# Patient Record
Sex: FEMALE | Race: BLACK OR AFRICAN AMERICAN | NOT HISPANIC OR LATINO | Employment: FULL TIME | URBAN - METROPOLITAN AREA
[De-identification: names, ages, dates, MRNs, and addresses within clinical notes are randomized per-mention and may not be internally consistent; named-entity substitution may affect disease eponyms.]

---

## 2024-06-26 ENCOUNTER — OFFICE VISIT (OUTPATIENT)
Dept: URGENT CARE | Facility: CLINIC | Age: 34
End: 2024-06-26
Payer: COMMERCIAL

## 2024-06-26 VITALS
RESPIRATION RATE: 18 BRPM | DIASTOLIC BLOOD PRESSURE: 84 MMHG | WEIGHT: 180 LBS | HEART RATE: 66 BPM | OXYGEN SATURATION: 98 % | HEIGHT: 67 IN | BODY MASS INDEX: 28.25 KG/M2 | TEMPERATURE: 99 F | SYSTOLIC BLOOD PRESSURE: 124 MMHG

## 2024-06-26 DIAGNOSIS — N30.01 ACUTE CYSTITIS WITH HEMATURIA: Primary | ICD-10-CM

## 2024-06-26 DIAGNOSIS — R30.0 DYSURIA: ICD-10-CM

## 2024-06-26 LAB
BILIRUB UR QL STRIP: NEGATIVE
GLUCOSE UR QL STRIP: NEGATIVE
KETONES UR QL STRIP: NEGATIVE
LEUKOCYTE ESTERASE UR QL STRIP: POSITIVE
PH, POC UA: 7
POC BLOOD, URINE: POSITIVE
POC NITRATES, URINE: NEGATIVE
PROT UR QL STRIP: NEGATIVE
SP GR UR STRIP: 1 (ref 1–1.03)
UROBILINOGEN UR STRIP-ACNC: ABNORMAL (ref 0.1–1.1)

## 2024-06-26 PROCEDURE — 81003 URINALYSIS AUTO W/O SCOPE: CPT | Mod: QW,,, | Performed by: FAMILY MEDICINE

## 2024-06-26 PROCEDURE — 87086 URINE CULTURE/COLONY COUNT: CPT | Performed by: FAMILY MEDICINE

## 2024-06-26 PROCEDURE — 99203 OFFICE O/P NEW LOW 30 MIN: CPT | Mod: ,,, | Performed by: FAMILY MEDICINE

## 2024-06-26 RX ORDER — PHENAZOPYRIDINE HYDROCHLORIDE 200 MG/1
200 TABLET, FILM COATED ORAL 3 TIMES DAILY PRN
Qty: 6 TABLET | Refills: 0 | Status: SHIPPED | OUTPATIENT
Start: 2024-06-26 | End: 2024-06-28

## 2024-06-26 RX ORDER — NITROFURANTOIN 25; 75 MG/1; MG/1
100 CAPSULE ORAL 2 TIMES DAILY
Qty: 10 CAPSULE | Refills: 0 | Status: SHIPPED | OUTPATIENT
Start: 2024-06-26 | End: 2024-07-01

## 2024-06-26 NOTE — PROGRESS NOTES
"Subjective:      Patient ID: Janet Tripp is a 34 y.o. female.    Vitals:  height is 5' 7" (1.702 m) and weight is 81.6 kg (180 lb). Her temperature is 98.7 °F (37.1 °C). Her blood pressure is 124/84 and her pulse is 66. Her respiration is 18 and oxygen saturation is 98%.     Chief Complaint: Dysuria     Patient is a 34 y.o. female who presents to urgent care with complaints of frequent urination, spasm  x1 days. Alleviating factors include azo with no relief. Patient denies fever.      Cahto:  34-year-old female otherwise healthy present to clinic with acute onset burning with urination, urgency and frequency associated with cramping lower abdomen started today.  States feels like muscle spasm with urination.  LMP June 9, 2024.  No history of UTI in the past.  No history of kidney stones.  Over-the-counter medications not much help.  No fever    ROS:  Constitutional : _No fever, no fatigue or weakness  Neck : _Negative except HPI  Respiratory :_ No coughing, no shortness of breath  Cardiovascular : _No chest pain, no palpitations  Gastrointestinal : _No nausea, no vomiting  Genitourinary : _No flank pain, no vaginal discharge  Integumentary : _Negative for skin rash   Objective:     Physical Exam  General : Alert and Oriented, No apparent distress, afebrile  Neck - supple  Respiratory : Bilateral equal breath sounds, nonlabored respirations, clear to auscultate   Cardiovascular : Rate rhythm regular, normal volume pulse  Gastrointestinal : Soft, mild suprapubic pressure on palpation , BS X 4 quadrants - normal  Genitourinary : No CVA tenderness Bilateral  Integumentary : Warm, Dry and no rash   Assessment:     1. Acute cystitis with hematuria    2. Dysuria      Plan:   Adequate hydration.  Discussed in detail on condition and course.  Medications as prescribed.   Urine Culture results will be monitored and reported, and treatment changes made if necessary.  ER precautions with worsening symptoms, fever, flank pain, " not able to urinate.   Call or return to clinic as needed. Voiced understanding verbally.  Urine dipstick results shared     Acute cystitis with hematuria  -     Urine culture  -     nitrofurantoin, macrocrystal-monohydrate, (MACROBID) 100 MG capsule; Take 1 capsule (100 mg total) by mouth 2 (two) times daily. for 5 days  Dispense: 10 capsule; Refill: 0  -     phenazopyridine (PYRIDIUM) 200 MG tablet; Take 1 tablet (200 mg total) by mouth 3 (three) times daily as needed for Pain.  Dispense: 6 tablet; Refill: 0    Dysuria  -     POCT Urinalysis, Dipstick, Automated, W/O Scope

## 2024-06-26 NOTE — PATIENT INSTRUCTIONS
Adequate hydration.  Discussed in detail on condition and course.  Medications as prescribed.   Urine Culture results will be monitored and reported, and treatment changes made if necessary.  ER precautions with worsening symptoms, fever, flank pain, not able to urinate.   Call or return to clinic as needed. Voiced understanding verbally.  Urine dipstick results shared

## 2024-06-28 ENCOUNTER — TELEPHONE (OUTPATIENT)
Dept: URGENT CARE | Facility: CLINIC | Age: 34
End: 2024-06-28
Payer: COMMERCIAL

## 2024-06-28 LAB — BACTERIA UR CULT: ABNORMAL

## 2024-06-28 NOTE — TELEPHONE ENCOUNTER
Pt was seen in clinic on 06/26/2024 by Nima Garcia MD for hematuria. Pt was Rx Macrobid 100 mg capsules and pyridium 200 mg.  Pt has contacted clinic in regards to urine cx. The preliminary results are posted; still awaiting final urine cx report. Pt also had concerns that antibiotic is not effective. Attempted to contact pt for further information regarding concerns. No success.     Would like us to contact the following line: [phone]

## 2024-06-29 NOTE — TELEPHONE ENCOUNTER
Unsuccessful in contacting patient to discuss urine culture result and the possibility of antibiotic change. Will attempt to contact patient again throughout the day.

## 2024-10-10 ENCOUNTER — OFFICE VISIT (OUTPATIENT)
Dept: FAMILY MEDICINE | Facility: CLINIC | Age: 34
End: 2024-10-10
Payer: COMMERCIAL

## 2024-10-10 VITALS
HEIGHT: 67 IN | WEIGHT: 169 LBS | OXYGEN SATURATION: 99 % | HEART RATE: 63 BPM | DIASTOLIC BLOOD PRESSURE: 74 MMHG | SYSTOLIC BLOOD PRESSURE: 109 MMHG | BODY MASS INDEX: 26.53 KG/M2

## 2024-10-10 DIAGNOSIS — R31.21 ASYMPTOMATIC MICROSCOPIC HEMATURIA: ICD-10-CM

## 2024-10-10 DIAGNOSIS — Z80.3 FAMILY HISTORY OF BREAST CANCER IN MOTHER: ICD-10-CM

## 2024-10-10 DIAGNOSIS — Z00.00 WELLNESS EXAMINATION: Primary | ICD-10-CM

## 2024-10-10 NOTE — PROGRESS NOTES
Patient ID: [id number]     Chief Complaint: Establish Care        HPI:     Janet Tripp is a 34 y.o. female here today for annual wellness exam.  The patient presents for well adult exam, here to establish care with new MD.    The patient's general health status is described as good.    The patient's diet is described as balanced.    Exercise: occasional.    Associated symptoms consist of denies weight loss, denies weight gain, denies fatigue, denies headache, denies hearing loss and denies vision changes.    Additional pertinent history: last dental exam: goes every 6 months, last eye exam: > 1 year ago(doesn't wear corrective lens, she doesn't have vision insurance, but she will schedule an appointment [facility] or [facility])  She is due for annual labs today. She is not interested in HIV and Hepatitis C testing.   LMP: 2024, regular.  Cervical Cancer Screening - Last Pap on 10/2023. Follow up with GYN Clinic for Pap/Pelvic with Dr. Suárez, she is scheduled for pap smear in 2024.     Vaccinations: COVID/ flu vaccine- not interested    - She is seeing an allergist (Dr. Sevilla) for history of urticaria with menstrual cycles, stable.     - She has microscopic hematuria, stable, undergoing a workup with Urology at [facility].     - Her mother  at 57 years ago due to breast cancer, patient is followed by GYN, not interested in referral to high risk breast clinic referral.     - Patient is without any other complaints today.         Advance Care Planning     Date: 10/10/2024  Patient did not wish or was not able to name a surrogate decision maker or provide an Advance Care Plan.        No past medical history on file.     Past Surgical History:   Procedure Laterality Date     SECTION      FINGER FRACTURE SURGERY         Review of patient's allergies indicates:  No Known Allergies    No outpatient medications have been marked as taking for the 10/10/24 encounter (Office Visit) with  Nikki Kam MD.       Social History     Socioeconomic History    Marital status:    Tobacco Use    Smoking status: Never    Smokeless tobacco: Never   Substance and Sexual Activity    Alcohol use: Never    Drug use: Never    Sexual activity: Yes     Social Drivers of Health     Financial Resource Strain: Low Risk  (10/8/2024)    Overall Financial Resource Strain (CARDIA)     Difficulty of Paying Living Expenses: Not hard at all   Food Insecurity: No Food Insecurity (10/8/2024)    Hunger Vital Sign     Worried About Running Out of Food in the Last Year: Never true     Ran Out of Food in the Last Year: Never true   Transportation Needs: No Transportation Needs (10/10/2024)    TRANSPORTATION NEEDS     Transportation : No   Physical Activity: Sufficiently Active (10/8/2024)    Exercise Vital Sign     Days of Exercise per Week: 4 days     Minutes of Exercise per Session: 60 min   Stress: Stress Concern Present (10/8/2024)    [employer] [place] [employer] - Occupational Stress Questionnaire     Feeling of Stress : To some extent   Housing Stability: Low Risk  (10/10/2024)    Housing Stability Vital Sign     Unable to Pay for Housing in the Last Year: No     Homeless in the Last Year: No        Family History   Problem Relation Name Age of Onset    Breast cancer Mother      No Known Problems Father          Subjective:       Review of Systems:    See HPI for details    Constitutional: Denies Change in appetite. Denies Chills. Denies Fever. Denies Night sweats.  Eye: Denies Blurred vision. Denies Discharge. Denies Eye pain.  ENT: Denies Decreased hearing. Denies Sore throat. Denies Swollen glands.  Respiratory: Denies Cough. Denies Shortness of breath. Denies Shortness of breath with exertion. Denies Wheezing.  Cardiovascular: Denies Chest pain at rest. Denies Chest pain with exertion. Denies Irregular heartbeat. Denies Palpitations.  Gastrointestinal: Denies Abdominal pain. Denies Diarrhea.  "Denies Nausea. Denies Vomiting. Denies Hematemesis or Hematochezia.  Genitourinary: Denies Dysuria. Denies Urinary frequency. Denies Urinary urgency. Denies Blood in urine.  Endocrine: Denies Cold intolerance. Denies Excessive thirst. Denies Heat intolerance. Denies Weight loss. Denies Weight gain.  Musculoskeletal: Denies Painful joints. Denies Weakness.  Integumentary: Denies Rash. Denies Itching. Denies Dry skin.  Neurologic: Denies Dizziness. Denies Fainting. Denies Headache.  Psychiatric: Denies Depression. Denies Anxiety. Denies Suicidal/Homicidal ideations.    All Other ROS: Negative except as stated in HPI.       Objective:     /74   Pulse 63   Ht 5' 7" (1.702 m)   Wt 76.7 kg (169 lb)   LMP 09/30/2024 (Exact Date)   SpO2 99%   BMI 26.47 kg/m²     Physical Exam    General: Alert and oriented, No acute distress. Overweight.   Head: Normocephalic, Atraumatic.  Eye: Pupils are equal, round and reactive to light, Extraocular movements are intact, Sclera non-icteric.  Ears/Nose/Throat: Normal, Mucosa moist,Clear.  Neck/Thyroid: Supple, Non-tender, No carotid bruit, No palpable thyromegaly or thyroid nodule, No lymphadenopathy, No JVD, Full range of motion.  Respiratory: Clear to auscultation bilaterally; No wheezes, rales or rhonchi,Non-labored respirations, Symmetrical chest wall expansion.  Cardiovascular: Regular rate and rhythm, S1/S2 normal, No murmurs, rubs or gallops.  Gastrointestinal: Soft, Non-tender, Non-distended, Normal bowel sounds, No palpable organomegaly.  Musculoskeletal: Normal range of motion.  Integumentary: Warm, Dry, Intact, No suspicious lesions or rashes.  Extremities: No clubbing, cyanosis or edema  Neurologic: No focal deficits, Cranial Nerves II-XII are grossly intact, Motor strength normal upper and lower extremities, Sensory exam intact.  Psychiatric: Normal interaction, Coherent speech, Euthymic mood, Appropriate affect         Assessment:       ICD-10-CM ICD-9-CM   1. " Wellness examination  Z00.00 V70.0   2. Family history of breast cancer in mother  Z80.3 V16.3   3. Asymptomatic microscopic hematuria  R31.21 599.72        Plan:     Problem List Items Addressed This Visit    None  Visit Diagnoses       Wellness examination    -  Primary    Relevant Orders    CBC Auto Differential    Comprehensive Metabolic Panel    Hemoglobin A1C    Lipid Panel    TSH    Vitamin D    Urinalysis, Reflex to Urine Culture    Family history of breast cancer in mother        Asymptomatic microscopic hematuria             1. Wellness examination  - CBC Auto Differential; Future  - Comprehensive Metabolic Panel; Future  - Hemoglobin A1C; Future  - Lipid Panel; Future  - TSH; Future  - Vitamin D; Future  - Urinalysis, Reflex to Urine Culture; Future  - Will treat pending lab results. Monthly breast self exam encouraged. Diet, exercise, and 10% weight loss encouraged. Keep appointment for dental exams x q6 months as scheduled. Keep appointment for annual eye exam as scheduled. Keep appointment with GYN for annual pap smear as scheduled. Continue followup with specialists as scheduled. Notify M.D. or ER if temp greater than 100.4, or any acute illness.      2. Family history of breast cancer in mother  - Continue follow with GYN as scheduled, if patient desires, will proceed with referral to high risk breast clinic.    3. Asymptomatic microscopic hematuria  - Continue followup with Urology as scheduled.      Janet was seen today for establish care.    Diagnoses and all orders for this visit:    Wellness examination  -     CBC Auto Differential; Future  -     Comprehensive Metabolic Panel; Future  -     Hemoglobin A1C; Future  -     Lipid Panel; Future  -     TSH; Future  -     Vitamin D; Future  -     Urinalysis, Reflex to Urine Culture; Future    Family history of breast cancer in mother    Asymptomatic microscopic hematuria                 Follow up in about 1 year (around 10/10/2025) for Wellness.

## 2024-10-10 NOTE — LETTER
AUTHORIZATION FOR RELEASE OF   CONFIDENTIAL INFORMATION    Dear Medical Records,    We are seeing Janet Tripp, date of birth 1990, in the clinic at [facility]. Nikki Kam MD is the patient's PCP. Janet Tripp has an outstanding lab/procedure at the time we reviewed her chart. In order to help keep her health information updated, she has authorized us to request the following medical record(s):        (  )  MAMMOGRAM                                      (  )  COLONOSCOPY      (X  )  PAP SMEAR                                          (  )  OUTSIDE LAB RESULTS     (  )  DEXA SCAN                                          (  )  EYE EXAM            (  )  FOOT EXAM                                          (  )  ENTIRE RECORD     (  )  OUTSIDE IMMUNIZATIONS                 (  )  _______________         Please fax records to Nkiki Kam MD, [phone]     If you have any questions, please contact Alfreda Broderick LPN at [phone].           Patient Name: Janet Tripp  : 1990  Patient Phone #: [phone]

## 2024-10-11 ENCOUNTER — LAB VISIT (OUTPATIENT)
Dept: LAB | Facility: HOSPITAL | Age: 34
End: 2024-10-11
Attending: FAMILY MEDICINE
Payer: COMMERCIAL

## 2024-10-11 ENCOUNTER — DOCUMENTATION ONLY (OUTPATIENT)
Dept: FAMILY MEDICINE | Facility: CLINIC | Age: 34
End: 2024-10-11
Payer: COMMERCIAL

## 2024-10-11 DIAGNOSIS — Z00.00 WELLNESS EXAMINATION: ICD-10-CM

## 2024-10-11 LAB
25(OH)D3+25(OH)D2 SERPL-MCNC: 35 NG/ML (ref 30–80)
ALBUMIN SERPL-MCNC: 3.8 G/DL (ref 3.5–5)
ALBUMIN/GLOB SERPL: 1 RATIO (ref 1.1–2)
ALP SERPL-CCNC: 62 UNIT/L (ref 40–150)
ALT SERPL-CCNC: 10 UNIT/L (ref 0–55)
ANION GAP SERPL CALC-SCNC: 6 MEQ/L
AST SERPL-CCNC: 13 UNIT/L (ref 5–34)
BASOPHILS # BLD AUTO: 0.07 X10(3)/MCL
BASOPHILS NFR BLD AUTO: 0.7 %
BILIRUB SERPL-MCNC: 1 MG/DL
BILIRUB UR QL STRIP.AUTO: NEGATIVE
BUN SERPL-MCNC: 8.1 MG/DL (ref 7–18.7)
CALCIUM SERPL-MCNC: 9.2 MG/DL (ref 8.4–10.2)
CHLORIDE SERPL-SCNC: 107 MMOL/L (ref 98–107)
CHOLEST SERPL-MCNC: 117 MG/DL
CHOLEST/HDLC SERPL: 3 {RATIO} (ref 0–5)
CLARITY UR: CLEAR
CO2 SERPL-SCNC: 26 MMOL/L (ref 22–29)
COLOR UR AUTO: NORMAL
CREAT SERPL-MCNC: 0.91 MG/DL (ref 0.55–1.02)
CREAT/UREA NIT SERPL: 9
EOSINOPHIL # BLD AUTO: 0.05 X10(3)/MCL (ref 0–0.9)
EOSINOPHIL NFR BLD AUTO: 0.5 %
ERYTHROCYTE [DISTWIDTH] IN BLOOD BY AUTOMATED COUNT: 13 % (ref 11.5–17)
EST. AVERAGE GLUCOSE BLD GHB EST-MCNC: 91.1 MG/DL
GFR SERPLBLD CREATININE-BSD FMLA CKD-EPI: >60 ML/MIN/1.73/M2
GLOBULIN SER-MCNC: 4 GM/DL (ref 2.4–3.5)
GLUCOSE SERPL-MCNC: 90 MG/DL (ref 74–100)
GLUCOSE UR QL STRIP: NEGATIVE
HBA1C MFR BLD: 4.8 %
HCT VFR BLD AUTO: 37.5 % (ref 37–47)
HDLC SERPL-MCNC: 44 MG/DL (ref 35–60)
HGB BLD-MCNC: 12.1 G/DL (ref 12–16)
HGB UR QL STRIP: NEGATIVE
IMM GRANULOCYTES # BLD AUTO: 0.02 X10(3)/MCL (ref 0–0.04)
IMM GRANULOCYTES NFR BLD AUTO: 0.2 %
KETONES UR QL STRIP: NEGATIVE
LDLC SERPL CALC-MCNC: 66 MG/DL (ref 50–140)
LEUKOCYTE ESTERASE UR QL STRIP: NEGATIVE
LYMPHOCYTES # BLD AUTO: 2.3 X10(3)/MCL (ref 0.6–4.6)
LYMPHOCYTES NFR BLD AUTO: 24.6 %
MCH RBC QN AUTO: 31.2 PG (ref 27–31)
MCHC RBC AUTO-ENTMCNC: 32.3 G/DL (ref 33–36)
MCV RBC AUTO: 96.6 FL (ref 80–94)
MONOCYTES # BLD AUTO: 0.62 X10(3)/MCL (ref 0.1–1.3)
MONOCYTES NFR BLD AUTO: 6.6 %
NEUTROPHILS # BLD AUTO: 6.3 X10(3)/MCL (ref 2.1–9.2)
NEUTROPHILS NFR BLD AUTO: 67.4 %
NITRITE UR QL STRIP: NEGATIVE
NRBC BLD AUTO-RTO: 0 %
PAP RECOMMENDATION EXT: NORMAL
PAP SMEAR: NORMAL
PH UR STRIP: 6 [PH]
PLATELET # BLD AUTO: 275 X10(3)/MCL (ref 130–400)
PMV BLD AUTO: 11.8 FL (ref 7.4–10.4)
POTASSIUM SERPL-SCNC: 3.9 MMOL/L (ref 3.5–5.1)
PROT SERPL-MCNC: 7.8 GM/DL (ref 6.4–8.3)
PROT UR QL STRIP: NEGATIVE
RBC # BLD AUTO: 3.88 X10(6)/MCL (ref 4.2–5.4)
SODIUM SERPL-SCNC: 139 MMOL/L (ref 136–145)
SP GR UR STRIP.AUTO: 1.02 (ref 1–1.03)
TRIGL SERPL-MCNC: 33 MG/DL (ref 37–140)
TSH SERPL-ACNC: 0.79 UIU/ML (ref 0.35–4.94)
UROBILINOGEN UR STRIP-ACNC: 0.2
VLDLC SERPL CALC-MCNC: 7 MG/DL
WBC # BLD AUTO: 9.36 X10(3)/MCL (ref 4.5–11.5)

## 2024-10-11 PROCEDURE — 81003 URINALYSIS AUTO W/O SCOPE: CPT

## 2024-10-11 PROCEDURE — 80061 LIPID PANEL: CPT

## 2024-10-11 PROCEDURE — 36415 COLL VENOUS BLD VENIPUNCTURE: CPT

## 2024-10-11 PROCEDURE — 84443 ASSAY THYROID STIM HORMONE: CPT

## 2024-10-11 PROCEDURE — 85025 COMPLETE CBC W/AUTO DIFF WBC: CPT

## 2024-10-11 PROCEDURE — 83036 HEMOGLOBIN GLYCOSYLATED A1C: CPT

## 2024-10-11 PROCEDURE — 80053 COMPREHEN METABOLIC PANEL: CPT

## 2024-10-11 PROCEDURE — 82306 VITAMIN D 25 HYDROXY: CPT

## 2024-10-14 DIAGNOSIS — R77.1 ELEVATED SERUM GLOBULIN LEVEL: Primary | ICD-10-CM

## 2024-10-15 ENCOUNTER — PATIENT MESSAGE (OUTPATIENT)
Dept: FAMILY MEDICINE | Facility: CLINIC | Age: 34
End: 2024-10-15
Payer: COMMERCIAL

## 2024-10-16 ENCOUNTER — LAB VISIT (OUTPATIENT)
Dept: LAB | Facility: HOSPITAL | Age: 34
End: 2024-10-16
Attending: FAMILY MEDICINE
Payer: COMMERCIAL

## 2024-10-16 DIAGNOSIS — R77.1 ELEVATED SERUM GLOBULIN LEVEL: ICD-10-CM

## 2024-10-16 PROCEDURE — 36415 COLL VENOUS BLD VENIPUNCTURE: CPT

## 2024-10-16 PROCEDURE — 86431 RHEUMATOID FACTOR QUANT: CPT

## 2024-10-16 PROCEDURE — 84165 PROTEIN E-PHORESIS SERUM: CPT

## 2024-10-16 PROCEDURE — 86200 CCP ANTIBODY: CPT

## 2024-10-16 PROCEDURE — 86235 NUCLEAR ANTIGEN ANTIBODY: CPT

## 2024-10-16 PROCEDURE — 86431 RHEUMATOID FACTOR QUANT: CPT | Mod: 59

## 2024-10-17 DIAGNOSIS — D89.2 HYPERGAMMAGLOBULINEMIA, UNSPECIFIED: Primary | ICD-10-CM

## 2024-10-17 LAB
ANTINUCLEAR ANTIBODY SCREEN (OHS): NEGATIVE
CENTROMERE QUANT (OHS): 0.6 U/ML
CYCLIC CITRULLINATED PEPTIDE (CCP) (OHS): 1.5 U/ML
DSDNA AB QUANT (OHS): 1.2 IU/ML
JO-1 AB QUANT (OHS): 0.3 U/ML
PATH REV: NORMAL
RHEUMATOID FACTOR IGA QUANTITATIVE (OLG): 3.9 IU/ML
RHEUMATOID FACTOR IGM QUANTITATIVE (OLG): 1.3 IU/ML
RNP70 AB QUANT (OHS): 2.7 U/ML
SCL-70S AB QUANT (OHS): 1 U/ML
SMITH AB QUANT (OHS): 1.1 U/ML
SSA(RO) AB QUANT (OHS): 0.6 U/ML
SSB(LA) AB QUANT (OHS): <0.4 U/ML
U1RNP AB QUANT (OHS): 2.2 U/ML

## 2024-10-18 ENCOUNTER — TELEPHONE (OUTPATIENT)
Dept: FAMILY MEDICINE | Facility: CLINIC | Age: 34
End: 2024-10-18
Payer: COMMERCIAL

## 2024-10-18 ENCOUNTER — PATIENT MESSAGE (OUTPATIENT)
Dept: FAMILY MEDICINE | Facility: CLINIC | Age: 34
End: 2024-10-18
Payer: COMMERCIAL

## 2024-10-18 ENCOUNTER — E-CONSULT (OUTPATIENT)
Dept: RHEUMATOLOGY | Facility: CLINIC | Age: 34
End: 2024-10-18
Payer: COMMERCIAL

## 2024-10-18 DIAGNOSIS — D89.2 HYPERGAMMAGLOBULINEMIA, UNSPECIFIED: Primary | ICD-10-CM

## 2024-10-18 DIAGNOSIS — D89.0 POLYCLONAL HYPERGAMMAGLOBULINEMIA: Primary | ICD-10-CM

## 2024-10-18 LAB
ALBUMIN % SPEP (OHS): 49.7 (ref 48.1–59.5)
ALBUMIN SERPL-MCNC: 3.5 G/DL (ref 3.5–5)
ALBUMIN/GLOB SERPL: 1 RATIO (ref 1.1–2)
ALPHA 1 GLOB (OHS): 0.24 GM/DL (ref 0–0.4)
ALPHA 1 GLOB% (OHS): 3.41 (ref 2.3–4.9)
ALPHA 2 GLOB % (OHS): 8.5 (ref 6.9–13)
ALPHA 2 GLOB (OHS): 0.59 GM/DL (ref 0.4–1)
BETA GLOB (OHS): 1.05 GM/DL (ref 0.7–1.3)
BETA GLOB% (OHS): 15.01 (ref 13.8–19.7)
GAMMA GLOBULIN % (OHS): 23.38 (ref 10.1–21.9)
GAMMA GLOBULIN (OHS): 1.64 GM/DL (ref 0.4–1.8)
GLOBULIN SER-MCNC: 3.5 GM/DL (ref 2.4–3.5)
M SPIKE % (OHS): ABNORMAL
M SPIKE (OHS): ABNORMAL
PROT SERPL-MCNC: 7 GM/DL (ref 6.4–8.3)

## 2024-10-18 NOTE — TELEPHONE ENCOUNTER
----- Message from Barrett sent at 10/18/2024  8:02 AM CDT -----  .Type:  Needs Medical Advice    Who Called: Janet  Symptoms (please be specific):    How long has patient had these symptoms:    Pharmacy name and phone #:    Would the patient rather a call back or a response via [facility]?   Best Call Back Number: [phone]  Additional Information: Patient calling in to speak with MsLeo Alfreda re:  returning a call back from her she missed about some lab work, Thanks.

## 2024-10-18 NOTE — TELEPHONE ENCOUNTER
----- Message from Nikki Kam MD sent at 10/17/2024  5:45 PM CDT -----  Serum protein electrophoresis is negative for M spike, but shows an increased gamma globulin level. Polyclonal gammopathy,  also known as hypergammaglobulinemia, is when cells in your blood make too much of certain immune proteins (immunoglobulins). These proteins, also known as antibodies, help your body fight infections and illnesses. If you have polyclonal gammopathy, your immune system is activated. This happens when an infection or autoimmune disease tells your immune system to keep making antibodies. Polyclonal gammopathy can develop if you have and infection liver disease, autoimmune condition or hematologic condition. Testing panel for auto-immune disease and rheumatoid arthritis panel is negative. Please advise if patient consents to an e-consult from a Hematologist (blood specialist) and a Rheumatologist (auto-immune specialist) for them to review her chart, lab results and give further recommendations.

## 2024-10-18 NOTE — CONSULTS
[facility] [place] [facility]  Response for E-Consult     Patient Name: Janet Tripp  MRN: [id number]  Primary Care Provider: Nikki Kam MD   Requesting Provider: Nikki Kam, *  Consults    Hypergammaglobulinemia, please advise on recommendations for further workup if needed. Thank you for your time!       Recommendation: One of the differential for polyclonal hypergammaglobulinemia is autoimmune inflammatory connective tissue disease. If patient does not have symptoms of inflammatory rheumatic diseases, no further work up is required. Other differentials includes lymphoma. I would recommend CT chest/abdomen/pelvis with and without contrast with routine oral contrast . I would also recommend consulting hematology/oncology.     Total time of Consultation: 5 minute    I did not speak to the requesting provider verbally about this.     *This eConsult is based on the clinical data available to me and is furnished without benefit of a physical examination. The eConsult will need to be interpreted in light of any clinical issues or changes in patient status not available to me at the time of filing this eConsults. Significant changes in patient condition or level of acuity should result in immediate formal consultation and reevaluation. Please alert me if you have further questions.    Thank you for this eConsult referral.     Jean Banks MD  [facility] [place] [facility]

## 2024-10-21 ENCOUNTER — LAB VISIT (OUTPATIENT)
Dept: LAB | Facility: HOSPITAL | Age: 34
End: 2024-10-21
Attending: FAMILY MEDICINE
Payer: COMMERCIAL

## 2024-10-21 ENCOUNTER — TELEPHONE (OUTPATIENT)
Dept: FAMILY MEDICINE | Facility: CLINIC | Age: 34
End: 2024-10-21
Payer: COMMERCIAL

## 2024-10-21 ENCOUNTER — PATIENT MESSAGE (OUTPATIENT)
Dept: FAMILY MEDICINE | Facility: CLINIC | Age: 34
End: 2024-10-21
Payer: COMMERCIAL

## 2024-10-21 DIAGNOSIS — R07.9 CHEST PAIN, UNSPECIFIED TYPE: ICD-10-CM

## 2024-10-21 DIAGNOSIS — R76.8 HIGH TOTAL SERUM IGG: Primary | ICD-10-CM

## 2024-10-21 DIAGNOSIS — F41.1 GENERALIZED ANXIETY DISORDER: Primary | ICD-10-CM

## 2024-10-21 DIAGNOSIS — D89.2 HYPERGAMMAGLOBULINEMIA, UNSPECIFIED: ICD-10-CM

## 2024-10-21 LAB
IGA SERPL-MCNC: 287 MG/DL (ref 65–421)
IGG SERPL-MCNC: 1734 MG/DL (ref 522–1631)
IGM SERPL-MCNC: 103 MG/DL (ref 33–293)

## 2024-10-21 PROCEDURE — 83521 IG LIGHT CHAINS FREE EACH: CPT

## 2024-10-21 PROCEDURE — 84165 PROTEIN E-PHORESIS SERUM: CPT

## 2024-10-21 PROCEDURE — 82784 ASSAY IGA/IGD/IGG/IGM EACH: CPT

## 2024-10-21 PROCEDURE — 86334 IMMUNOFIX E-PHORESIS SERUM: CPT

## 2024-10-21 PROCEDURE — 36415 COLL VENOUS BLD VENIPUNCTURE: CPT

## 2024-10-21 RX ORDER — BUSPIRONE HYDROCHLORIDE 5 MG/1
5 TABLET ORAL 2 TIMES DAILY PRN
Qty: 60 TABLET | Refills: 2 | Status: SHIPPED | OUTPATIENT
Start: 2024-10-21 | End: 2025-01-19

## 2024-10-21 NOTE — ADDENDUM NOTE
Addended by: HOSEA JJ on: 10/21/2024 05:51 PM     Modules accepted: Orders    
[employer] dose 1, 2, and 3

## 2024-10-21 NOTE — TELEPHONE ENCOUNTER
----- Message from Francisco J sent at 10/21/2024  1:26 PM CDT -----  .Who Called: Janet Tripp    Caller is requesting assistance/information from provider's office.    Symptoms (please be specific): anxiety, chest pains   How long has patient had these symptoms:    List of preferred pharmacies on file (remove unneeded):       Preferred Method of Contact: Phone Call  Patient's Preferred Phone Number on File: [phone]   Best Call Back Number, if different:  Additional Information:

## 2024-10-21 NOTE — TELEPHONE ENCOUNTER
Pt called stating she went to ED on 10/16 and she is still having chest pain and shortness of breath. She asked if you had any additional recommendations. Please advise Thanks

## 2024-10-22 ENCOUNTER — PATIENT MESSAGE (OUTPATIENT)
Dept: FAMILY MEDICINE | Facility: CLINIC | Age: 34
End: 2024-10-22
Payer: COMMERCIAL

## 2024-10-22 ENCOUNTER — TELEPHONE (OUTPATIENT)
Dept: FAMILY MEDICINE | Facility: CLINIC | Age: 34
End: 2024-10-22
Payer: COMMERCIAL

## 2024-10-22 DIAGNOSIS — D89.89 KAPPA LIGHT CHAIN DISEASE: Primary | ICD-10-CM

## 2024-10-22 LAB
ALBUMIN % SPEP (OHS): 45.36 (ref 48.1–59.5)
ALBUMIN SERPL-MCNC: 3.3 G/DL (ref 3.5–5)
ALBUMIN/GLOB SERPL: 0.8 RATIO (ref 1.1–2)
ALPHA 1 GLOB (OHS): 0.26 GM/DL (ref 0–0.4)
ALPHA 1 GLOB% (OHS): 3.68 (ref 2.3–4.9)
ALPHA 2 GLOB % (OHS): 9.55 (ref 6.9–13)
ALPHA 2 GLOB (OHS): 0.69 GM/DL (ref 0.4–1)
BETA GLOB (OHS): 1.27 GM/DL (ref 0.7–1.3)
BETA GLOB% (OHS): 17.68 (ref 13.8–19.7)
GAMMA GLOBULIN % (OHS): 23.72 (ref 10.1–21.9)
GAMMA GLOBULIN (OHS): 1.71 GM/DL (ref 0.4–1.8)
GLOBULIN SER-MCNC: 3.9 GM/DL (ref 2.4–3.5)
KAPPA LC FREE SER NEPH-MCNC: 2 MG/DL (ref 0.33–1.94)
KAPPA LC FREE/LAMBDA FREE SER NEPH: 1.37 {RATIO} (ref 0.26–1.65)
LAMBDA LC FREE SERPL-MCNC: 1.46 MG/DL (ref 0.57–2.63)
M SPIKE % (OHS): ABNORMAL
M SPIKE (OHS): ABNORMAL
PATH REV: NORMAL
PROT SERPL-MCNC: 7.2 GM/DL (ref 6.4–8.3)

## 2024-10-22 PROCEDURE — 84156 ASSAY OF PROTEIN URINE: CPT | Performed by: FAMILY MEDICINE

## 2024-10-22 PROCEDURE — 84166 PROTEIN E-PHORESIS/URINE/CSF: CPT | Performed by: FAMILY MEDICINE

## 2024-10-22 NOTE — TELEPHONE ENCOUNTER
----- Message from Nikki Kam MD sent at 10/21/2024  7:51 PM CDT -----  IgG level is elevated. Elevated levels of immunoglobulin G (IgG) can be a sign of a number of conditions, including infections, autoimmune disorders, and chronic liver disease. Awaiting results of remaining pending labs for further evaluation/treatment recommendations. IgA and IgM levels are normal.

## 2024-10-23 ENCOUNTER — PATIENT MESSAGE (OUTPATIENT)
Dept: FAMILY MEDICINE | Facility: CLINIC | Age: 34
End: 2024-10-23
Payer: COMMERCIAL

## 2024-10-23 ENCOUNTER — TELEPHONE (OUTPATIENT)
Dept: FAMILY MEDICINE | Facility: CLINIC | Age: 34
End: 2024-10-23
Payer: COMMERCIAL

## 2024-10-23 NOTE — TELEPHONE ENCOUNTER
----- Message from Nikki Kam MD sent at 10/22/2024  4:52 PM CDT -----  [place]ke free light chains are elevated and can indicate chronic inflammation or plasma cell disorder, I placed a referral to a Hematologist for further evaluation/treatment recommendations.

## 2024-10-25 ENCOUNTER — TELEPHONE (OUTPATIENT)
Dept: HEMATOLOGY/ONCOLOGY | Facility: CLINIC | Age: 34
End: 2024-10-25
Payer: COMMERCIAL

## 2024-10-25 NOTE — TELEPHONE ENCOUNTER
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    Esme Dunn NP has reviewed this pt's results.    She stated that pt has normal immunofixation, no M spike. [place] free light chain is 2. Normal goes up to 1.94.     Pt does not need further work up at this time with HemOnc.    Please let us know if you have any questions.    ELI Abdi

## 2024-10-29 ENCOUNTER — TELEPHONE (OUTPATIENT)
Dept: FAMILY MEDICINE | Facility: CLINIC | Age: 34
End: 2024-10-29
Payer: COMMERCIAL

## 2024-11-04 DIAGNOSIS — D89.2 HYPERGAMMAGLOBULINEMIA, UNSPECIFIED: Primary | ICD-10-CM

## 2024-11-04 DIAGNOSIS — D89.89 KAPPA LIGHT CHAIN DISEASE: ICD-10-CM

## 2024-11-14 ENCOUNTER — HOSPITAL ENCOUNTER (OUTPATIENT)
Dept: RADIOLOGY | Facility: HOSPITAL | Age: 34
Discharge: HOME | End: 2024-11-14
Attending: FAMILY MEDICINE
Payer: COMMERCIAL

## 2024-11-14 ENCOUNTER — PATIENT MESSAGE (OUTPATIENT)
Dept: FAMILY MEDICINE | Facility: CLINIC | Age: 34
End: 2024-11-14
Payer: COMMERCIAL

## 2024-11-14 ENCOUNTER — TELEPHONE (OUTPATIENT)
Dept: FAMILY MEDICINE | Facility: CLINIC | Age: 34
End: 2024-11-14
Payer: COMMERCIAL

## 2024-11-14 DIAGNOSIS — N83.202 LEFT OVARIAN CYST: Primary | ICD-10-CM

## 2024-11-14 DIAGNOSIS — D89.89 KAPPA LIGHT CHAIN DISEASE: ICD-10-CM

## 2024-11-14 DIAGNOSIS — D89.2 HYPERGAMMAGLOBULINEMIA, UNSPECIFIED: ICD-10-CM

## 2024-11-14 PROCEDURE — 71250 CT THORAX DX C-: CPT | Mod: TC

## 2024-11-14 PROCEDURE — 25500020 PHARM REV CODE 255: Performed by: FAMILY MEDICINE

## 2024-11-14 PROCEDURE — 74178 CT ABD&PLV WO CNTR FLWD CNTR: CPT | Mod: TC

## 2024-11-14 RX ORDER — IOPAMIDOL 755 MG/ML
100 INJECTION, SOLUTION INTRAVASCULAR
Status: COMPLETED | OUTPATIENT
Start: 2024-11-14 | End: 2024-11-14

## 2024-11-14 RX ORDER — DIATRIZOATE MEGLUMINE AND DIATRIZOATE SODIUM 660; 100 MG/ML; MG/ML
30 SOLUTION ORAL; RECTAL
Status: COMPLETED | OUTPATIENT
Start: 2024-11-14 | End: 2024-11-14

## 2024-11-14 RX ADMIN — IOPAMIDOL 100 ML: 755 INJECTION, SOLUTION INTRAVENOUS at 11:11

## 2024-11-14 RX ADMIN — DIATRIZOATE MEGLUMINE AND DIATRIZOATE SODIUM 30 ML: 660; 100 LIQUID ORAL; RECTAL at 11:11

## 2024-11-14 NOTE — TELEPHONE ENCOUNTER
----- Message from Nikki Kam MD sent at 11/14/2024  1:13 PM CST -----  CT abdomen/pelvis shows evidence of a left ovarian cyst, I placed an order for an ultrasound of her pelvis to further evaluate this cyst. There is no other abnormality seen. No evidence of lymphoproliferative process seen.

## 2024-11-22 ENCOUNTER — HOSPITAL ENCOUNTER (OUTPATIENT)
Dept: RADIOLOGY | Facility: HOSPITAL | Age: 34
Discharge: HOME | End: 2024-11-22
Attending: FAMILY MEDICINE
Payer: COMMERCIAL

## 2024-11-22 DIAGNOSIS — N83.202 LEFT OVARIAN CYST: ICD-10-CM

## 2024-11-22 PROCEDURE — 76830 TRANSVAGINAL US NON-OB: CPT | Mod: TC

## 2024-11-25 ENCOUNTER — PATIENT MESSAGE (OUTPATIENT)
Dept: FAMILY MEDICINE | Facility: CLINIC | Age: 34
End: 2024-11-25
Payer: COMMERCIAL

## 2024-12-12 ENCOUNTER — PATIENT MESSAGE (OUTPATIENT)
Dept: RESEARCH | Facility: HOSPITAL | Age: 34
End: 2024-12-12
Payer: COMMERCIAL

## 2025-02-04 LAB
PAP RECOMMENDATION EXT: NORMAL
PAP SMEAR: NORMAL

## 2025-07-10 ENCOUNTER — OFFICE VISIT (OUTPATIENT)
Dept: FAMILY MEDICINE | Facility: CLINIC | Age: 35
End: 2025-07-10
Payer: COMMERCIAL

## 2025-07-10 VITALS
TEMPERATURE: 98 F | RESPIRATION RATE: 16 BRPM | WEIGHT: 177 LBS | BODY MASS INDEX: 27.78 KG/M2 | HEART RATE: 60 BPM | DIASTOLIC BLOOD PRESSURE: 67 MMHG | OXYGEN SATURATION: 99 % | SYSTOLIC BLOOD PRESSURE: 103 MMHG | HEIGHT: 67 IN

## 2025-07-10 DIAGNOSIS — J32.9 CHRONIC RECURRENT SINUSITIS: Primary | ICD-10-CM

## 2025-07-10 DIAGNOSIS — J32.9 CHRONIC SINUSITIS, UNSPECIFIED LOCATION: ICD-10-CM

## 2025-07-10 DIAGNOSIS — D89.2 HYPERGAMMAGLOBULINEMIA, UNSPECIFIED: ICD-10-CM

## 2025-07-10 DIAGNOSIS — R76.8 HIGH TOTAL SERUM IGG: ICD-10-CM

## 2025-07-10 DIAGNOSIS — L50.1 IDIOPATHIC URTICARIA: ICD-10-CM

## 2025-07-10 DIAGNOSIS — R76.8 ELEVATED SERUM IMMUNOGLOBULIN FREE LIGHT CHAIN LEVEL: ICD-10-CM

## 2025-07-10 RX ORDER — FLUTICASONE PROPIONATE 50 MCG
2 SPRAY, SUSPENSION (ML) NASAL DAILY
Qty: 16 G | Refills: 5 | Status: SHIPPED | OUTPATIENT
Start: 2025-07-10 | End: 2026-07-10

## 2025-07-10 RX ORDER — LORATADINE 10 MG/1
10 TABLET ORAL DAILY
Qty: 90 TABLET | Refills: 3 | Status: SHIPPED | OUTPATIENT
Start: 2025-07-10 | End: 2026-07-10

## 2025-07-10 NOTE — PROGRESS NOTES
Patient ID: [id number]     Chief Complaint: Follow-up (Urgent care 25), ear trouble, and Dizziness        HPI:     Janet Tripp is a 35 y.o. female here today for a follow up s/p [facility] visit due to bilateral ear fullness and dizziness and itchy throat x 2 weeks, went to Urgent Care on 2025 for these symptoms, had negative Strep test, and was told she had fluid in her ears, was diagnosed with bilateral ear infection and treated with oral steroids and Augmentin without resolution of symptoms.   - She currently reports throat irritation and feels irritation in right ear . She denies sneezing, watery eyes, nasal congestion, fever, chills, nausea, vomiting, or heartburn. She reports dry cough. She denies SOB or wheezing or hemoptysis.   - She reports idiopathic hives, taking Benadryl intermittently, currently asymptomatic. Tried taking Zyrtec, but caused drowsiness. She has a history of elevated kappa light chain, elevated IgG and elevated gamma globin, was cleared by Hematology, but would like repeat labs.   - Patient is without any other complaints today.         No past medical history on file.     Past Surgical History:   Procedure Laterality Date     SECTION      FINGER FRACTURE SURGERY         Review of patient's allergies indicates:  No Known Allergies    No outpatient medications have been marked as taking for the 7/10/25 encounter (Office Visit) with Nikki Kam MD.       Social History[1]     Family History   Problem Relation Name Age of Onset    Breast cancer Mother      No Known Problems Father          Subjective:       Review of Systems:    See HPI for details    Constitutional: No fever, No chills, No fatigue.   Eye: No blurring, No visual disturbances.   Ear/Nose/Mouth/Throat: No decreased hearing, Reports ear pain, No nasal congestion, Reports sore throat.   Respiratory: No shortness of breath, Reports cough, No wheezing.   Cardiovascular: No chest pain, No  "palpitations, No peripheral edema.   Breast: Both breasts, No lump/ mass, No pain.   Nipple discharge: None.   Gastrointestinal: No nausea, No vomiting, No diarrhea, No constipation, No abdominal pain.   Genitourinary: No dysuria, No hematuria.   Gynecologic: Negative except as documented in history of present illness.   Hematology/Lymphatics: No bruising tendency, No bleeding tendency, No swollen lymph glands.   Endocrine: No excessive thirst, No polyuria, No excessive hunger.   Musculoskeletal: No joint pain, No muscle pain, No decreased range of motion.   Integumentary: As per HPI. No rash, No pruritus.   Neurologic: No abnormal balance, No confusion, No headache.   Psychiatric: No sleeping problems, No anxiety, No depression, Not suicidal, No hallucinations      All Other ROS: Negative except as stated in HPI.       Objective:     /67 (BP Location: Right arm, Patient Position: Sitting)   Pulse 60   Temp 97.6 °F (36.4 °C) (Oral)   Resp 16   Ht 5' 7" (1.702 m)   Wt 80.3 kg (177 lb)   LMP 06/23/2025 (Approximate)   SpO2 99%   BMI 27.72 kg/m²     Physical Exam    General: Alert and oriented, No acute distress.   Appearance: Overweight.   Eye: Pupils are equal, round and reactive to light, Normal conjunctiva.   HENT: Normocephalic, Tympanic membranes with serous effusion, no erythema, NL light reflex, intact, not bulging, Normal hearing, Oral mucosa is moist, No pharyngeal erythema.   Throat: Pharynx ( Not edematous, No exudate ). OP with lymphoid hyperplasia secondary to postnasal drip.  Neck: Supple, Non-tender, No carotid bruit, No lymphadenopathy, No thyromegaly.   Respiratory: Lungs are clear to auscultation, Respirations are non-labored, Breath sounds are equal, Symmetrical chest wall expansion, No chest wall tenderness.   Cardiovascular: Normal rate, Regular rhythm, No murmur, Good pulses equal in all extremities, No edema.   Gastrointestinal: Soft, Non-tender, Non-distended, Normal bowel sounds, " No organomegaly, Abdomen.   Genitourinary: No costovertebral angle tenderness.   Musculoskeletal: Normal range of motion, No tenderness, Normal gait.   Neurologic: No focal deficits, Cranial Nerves II-XII are grossly intact.   Psychiatric: Cooperative, Appropriate mood & affect, Normal judgment, Non-suicidal.   Mood and affect: Calm.   Behavior: Relaxed         Assessment:       ICD-10-CM ICD-9-CM   1. Chronic recurrent sinusitis  J32.9 473.9   2. Idiopathic urticaria  L50.1 708.1   3. Chronic sinusitis, unspecified location  J32.9 473.9   4. High total serum IgG  R76.8 790.99   5. Hypergammaglobulinemia, unspecified  D89.2 289.89   6. Elevated serum immunoglobulin free light chain level  R76.8 795.79        Plan:     Problem List Items Addressed This Visit          Immunology/Multi System    Hypergammaglobulinemia, unspecified    Relevant Orders    Immunofixation & Protein Electrophoresis & Immunoglobulins    High total serum IgG    Relevant Orders    Immunofixation & Protein Electrophoresis & Immunoglobulins     Other Visit Diagnoses         Chronic recurrent sinusitis    -  Primary    Relevant Medications    loratadine (CLARITIN) 10 mg tablet    fluticasone propionate (FLONASE) 50 mcg/actuation nasal spray    Other Relevant Orders    Food Allergy Panel    Allergen Respiratory Panel IgE - Region 6    CBC Auto Differential    Comprehensive Metabolic Panel      Idiopathic urticaria        Relevant Medications    loratadine (CLARITIN) 10 mg tablet    Other Relevant Orders    Food Allergy Panel    Allergen Respiratory Panel IgE - Region 6    CBC Auto Differential    Comprehensive Metabolic Panel    Sedimentation rate    C-Reactive Protein    TSH    C1 Esterase Inhibitor, Functional    Urinalysis, Reflex to Urine Culture Urine, Clean Catch      Chronic sinusitis, unspecified location        Relevant Orders    Food Allergy Panel    Allergen Respiratory Panel IgE - Region 6    CBC Auto Differential    Comprehensive  Metabolic Panel    CT Sinuses without Contrast      Elevated serum immunoglobulin free light chain level        Relevant Orders    Immunoglobulin Free LT Chains Blood         1. Chronic recurrent sinusitis  - Food Allergy Panel; Future  - Allergen Respiratory Panel IgE - Region 6; Future  - CBC Auto Differential; Future  - Comprehensive Metabolic Panel; Future  - Rx trial of loratadine (CLARITIN) 10 mg tablet; Take 1 tablet (10 mg total) by mouth once daily.  Dispense: 90 tablet; Refill: 3  - Rx trial of fluticasone propionate (FLONASE) 50 mcg/actuation nasal spray; 2 sprays (100 mcg total) by Each Nostril route once daily.  Dispense: 16 g; Refill: 5  - Increase fluid intake. Will proceed with ENT/Allergist referral if symptoms are refractory to treatment and pending results. Notify M.D. or ER if symptoms persist or worsen, shortness of breath, chest pain, coughing up blood, temp >100.4, or any acute illness.       2. Idiopathic urticaria  - Food Allergy Panel; Future  - Allergen Respiratory Panel IgE - Region 6; Future  - CBC Auto Differential; Future  - Comprehensive Metabolic Panel; Future  - loratadine (CLARITIN) 10 mg tablet; Take 1 tablet (10 mg total) by mouth once daily.  Dispense: 90 tablet; Refill: 3  - Sedimentation rate; Future  - C-Reactive Protein; Future  - TSH; Future  - C1 Esterase Inhibitor, Functional; Future  - Urinalysis, Reflex to Urine Culture Urine, Clean Catch; Future  - Increase fluid intake. Will proceed with ENT/Allergist referral if symptoms are refractory to treatment and pending results. Notify M.D. or ER if symptoms persist or worsen, shortness of breath, chest pain, coughing up blood, temp >100.4, or any acute illness.       3. Chronic sinusitis, unspecified location  - Food Allergy Panel; Future  - Allergen Respiratory Panel IgE - Region 6; Future  - CBC Auto Differential; Future  - Comprehensive Metabolic Panel; Future  - CT Sinuses without Contrast; Future  - Increase fluid  intake. Will proceed with ENT/Allergist referral if symptoms are refractory to treatment and pending results. Notify M.D. or ER if symptoms persist or worsen, shortness of breath, chest pain, coughing up blood, temp >100.4, or any acute illness.       4. High total serum IgG  - Immunofixation & Protein Electrophoresis & Immunoglobulins; Future  - Will treat pending results.     5. Hypergammaglobulinemia, unspecified  - Immunofixation & Protein Electrophoresis & Immunoglobulins; Future  - Will treat pending results.     6. Elevated serum immunoglobulin free light chain level  - Immunoglobulin Free LT Chains Blood; Future  - Will treat pending results.       Janet was seen today for follow-up, ear trouble and dizziness.    Diagnoses and all orders for this visit:    Chronic recurrent sinusitis  -     Food Allergy Panel; Future  -     Allergen Respiratory Panel IgE - Region 6; Future  -     CBC Auto Differential; Future  -     Comprehensive Metabolic Panel; Future  -     loratadine (CLARITIN) 10 mg tablet; Take 1 tablet (10 mg total) by mouth once daily.  -     fluticasone propionate (FLONASE) 50 mcg/actuation nasal spray; 2 sprays (100 mcg total) by Each Nostril route once daily.    Idiopathic urticaria  -     Food Allergy Panel; Future  -     Allergen Respiratory Panel IgE - Region 6; Future  -     CBC Auto Differential; Future  -     Comprehensive Metabolic Panel; Future  -     loratadine (CLARITIN) 10 mg tablet; Take 1 tablet (10 mg total) by mouth once daily.  -     Sedimentation rate; Future  -     C-Reactive Protein; Future  -     TSH; Future  -     C1 Esterase Inhibitor, Functional; Future  -     Urinalysis, Reflex to Urine Culture Urine, Clean Catch; Future    Chronic sinusitis, unspecified location  -     Food Allergy Panel; Future  -     Allergen Respiratory Panel IgE - Region 6; Future  -     CBC Auto Differential; Future  -     Comprehensive Metabolic Panel; Future  -     CT Sinuses without Contrast;  Future    High total serum IgG  -     Immunofixation & Protein Electrophoresis & Immunoglobulins; Future    Hypergammaglobulinemia, unspecified  -     Immunofixation & Protein Electrophoresis & Immunoglobulins; Future    Elevated serum immunoglobulin free light chain level  -     Immunoglobulin Free LT Chains Blood; Future          Medication List with Changes/Refills   New Medications    FLUTICASONE PROPIONATE (FLONASE) 50 MCG/ACTUATION NASAL SPRAY    2 sprays (100 mcg total) by Each Nostril route once daily.       Start Date: 7/10/2025 End Date: 7/10/2026    LORATADINE (CLARITIN) 10 MG TABLET    Take 1 tablet (10 mg total) by mouth once daily.       Start Date: 7/10/2025 End Date: 7/10/2026   Discontinued Medications    BUSPIRONE (BUSPAR) 5 MG TAB    Take 1 tablet (5 mg total) by mouth 2 (two) times daily as needed (anxiety).       Start Date: 10/21/2024End Date: 7/10/2025          Follow up in about 14 weeks (around 10/16/2025), or As previously scheduled or sooner if symptoms worsen or fail to improve.          [1]   Social History  Socioeconomic History    Marital status:    Tobacco Use    Smoking status: Never    Smokeless tobacco: Never   Substance and Sexual Activity    Alcohol use: Never    Drug use: Never    Sexual activity: Yes     Social Drivers of Health     Financial Resource Strain: Low Risk  (7/10/2025)    Overall Financial Resource Strain (CARDIA)     Difficulty of Paying Living Expenses: Not hard at all   Food Insecurity: No Food Insecurity (7/10/2025)    Hunger Vital Sign     Worried About Running Out of Food in the Last Year: Never true     Ran Out of Food in the Last Year: Never true   Transportation Needs: No Transportation Needs (7/10/2025)    PRAPARE - Transportation     Lack of Transportation (Medical): No     Lack of Transportation (Non-Medical): No   Physical Activity: Sufficiently Active (7/10/2025)    Exercise Vital Sign     Days of Exercise per Week: 3 days     Minutes of  Exercise per Session: 60 min   Stress: Stress Concern Present (7/10/2025)    [employer] - Occupational Stress Questionnaire     Feeling of Stress : Very much   Housing Stability: Low Risk  (7/10/2025)    Housing Stability Vital Sign     Unable to Pay for Housing in the Last Year: No     Homeless in the Last Year: No

## 2025-07-15 ENCOUNTER — DOCUMENTATION ONLY (OUTPATIENT)
Dept: FAMILY MEDICINE | Facility: CLINIC | Age: 35
End: 2025-07-15
Payer: COMMERCIAL

## 2025-07-18 ENCOUNTER — HOSPITAL ENCOUNTER (OUTPATIENT)
Dept: RADIOLOGY | Facility: HOSPITAL | Age: 35
Discharge: HOME | End: 2025-07-18
Attending: FAMILY MEDICINE
Payer: COMMERCIAL

## 2025-07-18 DIAGNOSIS — J32.9 CHRONIC SINUSITIS, UNSPECIFIED LOCATION: ICD-10-CM

## 2025-07-18 PROBLEM — J34.2 DEVIATED NASAL SEPTUM: Status: ACTIVE | Noted: 2025-07-18

## 2025-07-18 PROCEDURE — 70486 CT MAXILLOFACIAL W/O DYE: CPT | Mod: TC

## 2025-07-21 ENCOUNTER — PATIENT MESSAGE (OUTPATIENT)
Dept: FAMILY MEDICINE | Facility: CLINIC | Age: 35
End: 2025-07-21
Payer: COMMERCIAL